# Patient Record
Sex: FEMALE | Race: OTHER | Employment: FULL TIME | URBAN - METROPOLITAN AREA
[De-identification: names, ages, dates, MRNs, and addresses within clinical notes are randomized per-mention and may not be internally consistent; named-entity substitution may affect disease eponyms.]

---

## 2018-10-22 ENCOUNTER — HOSPITAL ENCOUNTER (OUTPATIENT)
Dept: PHYSICAL THERAPY | Age: 48
Discharge: HOME | End: 2018-10-22
Payer: COMMERCIAL

## 2018-10-22 PROCEDURE — 97140 MANUAL THERAPY 1/> REGIONS: CPT | Performed by: PHYSICAL THERAPIST

## 2018-10-22 PROCEDURE — 97162 PT EVAL MOD COMPLEX 30 MIN: CPT | Performed by: PHYSICAL THERAPIST

## 2018-10-22 NOTE — PROGRESS NOTES
PHYSICAL THERAPY - DAILY TREATMENT NOTE Patient Name: Tyler Stevens        Date: 10/22/2018 : 1970   YES Patient  Verified Visit #:   1   of   8  Insurance: Payor: Jered Marlow / Plan: [place] [employer]  CAPITATED PT / Product Type: Commerical / In time: 935 Out time:  Total Treatment Time: 54 TREATMENT AREA =  Neck, HA, SUBJECTIVE Pain Level (on 0 to 10 scale): 5-6 / 10 Medication Changes/New allergies or changes in medical history, any new surgeries or procedures? NO    If yes, update Summary List  
Subjective Functional Status/Changes:  []  No changes reported See IE OBJECTIVE 20 min Manual Therapy: Technique:     
Craniosacral therapy for CV4 release, frontal, parietal, sphenoid and OA release. Rationale:      decrease pain, increase ROM, increase tissue extensibility and decrease trigger points to improve patient's ability to  improve patient's ability to perform ADL's with decreased pain NC min Therapeutic Exercise:  [x]  See flow sheet Rationale:      increase ROM and increase strength to improve the patients ability to  improve patient's ability to perform ADL's with decreased pain 
  
throughout therapy min Patient Education:  Zohaib Robert []  Progressed/Changed HEP based on:   Educated the pt on anatomy of current condition, educated the patient on proper sleeping posture and initiated initial HEP. See chart Other Objective/Functional Measures: 
 
See IE Post Treatment Pain Level (on 0 to 10) scale:   3  / 10 ASSESSMENT Assessment/Changes in Function:  
 
See POC Decreaesd pain following CST []  See Progress Note/Recertification Patient will continue to benefit from skilled PT services to see POC Progress toward goals / Updated goals: 
See POC PLAN [x]  Upgrade activities as tolerated YES Continue plan of care  
[]  Discharge due to :   
[]  Other:   
 
Therapist: Rosa Pa, PT Date: 10/22/2018 Time: 1:36 PM  
 
Future Appointments Date Time Provider Zane Rodrigues 10/31/2018  3:30 PM Andrea Campuzano, PT [facility]  
11/5/2018 11:30 AM James Max, PT [facility]  
11/15/2018 10:30 AM Radhames Kulkarni PT [facility]  
11/19/2018 12:00 PM Radhames Kulkarni PT [facility]  
11/26/2018 10:30 AM James Max, PT [facility]

## 2018-10-22 NOTE — PROGRESS NOTES
Srinivasa Marques 31 [facility] PHYSICAL THERAPY AT [address] [facility], [address] [facility], [address], [address] [place] [address] - Phone: [phone]  Fax: [phone] PLAN OF CARE / STATEMENT OF MEDICAL NECESSITY FOR PHYSICAL THERAPY SERVICES Patient Name: Arthurine Mooring : 1970 Medical  
Diagnosis: Migraine, unspecified, not intractable, without status migrainosus [G43.909] Treatment Diagnosis: Migraine, unspecified, not intractable, without status migrainosus [G43.909] Onset Date: Chronic - increased frequency in last year Referral Source: Keeley Alberts MD Start of Care [facility]): 10/22/2018 Prior Hospitalization: See medical history Provider #: [id number] Prior Level of Function: Able to have increased recreational and exercise tolerance Comorbidities: Spinal fusion 2018 L5-S1 on precaution for flexion and lifting, CTS, hysterectomy and Ooverectomy  Medications: Verified on Patient Summary List  
The Plan of Care and following information is based on the information from the initial evaluation. Assessment / key information:  The pt is a 47 yo female who presents to [facility] with h/o chronic migraine HA without aura which intensified and increased in frequency within the last year insidiously. Max pain = 7/10 with unknown triggers but does note that deep tissue massage can bring on migraine, min pain= 0/10 reduced by reducing stress, walking. Her pain begins in the left shoulder (upper trap), moves around her ear to temple and over L eye. Reports the pain is always on the left and maybe twice a year moves over to her R eye. Her migraines have increased in frequency and last 3-4 days occurring 3 x per month, otherwise she has a HA 3-4 days per week. She is a  working 40 hours per week and is able to work through the pain.    Upon evaluation, the pt presents with 1) poor posture forward head and shoulders, , 2) lcervical ROM is [facility] except rotation R = 78 degrees, L=70 degrees  Upper body MMT = 5/5, 3) hypomobility through mid thoracic spine, TTP Tr-3, C7, C3-2. L>R UT ms spasms. Decreased amplitude of Craniosacral rhythm noted L>R. 4) special tests: cervical retraction reduced UT and cervical pain indicating possible disc pathology. FOTO = 74. She would benefit from a course of skilled PT to address these deficits and reduce HA pain and improve her posture which is likely contributing to pain.  
============================================================================= Eval Complexity: History MEDIUM  Complexity : 1-2 comorbidities / personal factors will impact the outcome/ POC ;  Examination  HIGH Complexity : 4+ Standardized tests and measures addressing body structure, function, activity limitation and / or participation in recreation ; Presentation MEDIUM Complexity : Evolving with changing characteristics ; Decision Making MEDIUM Complexity : FOTO score of 26-74; Overall Complexity MEDIUM Problem List: pain affecting function, decrease ROM, decrease strength, decrease ADL/ functional abilitiies and decrease activity tolerance Treatment Plan may include any combination of the following: Therapeutic exercise, Therapeutic activities, Neuromuscular re-education, Physical agent/modality, Manual therapy and Patient education  Dry needling Patient / Family readiness to learn indicated by: asking questions, trying to perform skills and interest 
Persons(s) to be included in education: patient (P) Barriers to Learning/Limitations: None Measures taken:    
Patient Goal (s): Relief from migraines or reduction of frequency. Patient self reported health status: good Rehabilitation Potential: good · Short Term Goals: To be accomplished in  4 weeks: 1. I with basic HEP and strategies to relieve pain. 2. The pt will report decreased frequency to <2 x per month for migraine HA. 2. The pt will report +2 Global rating of change to indicate reduced pain and increased ADL tolerance. · Long Term Goals: To be accomplished in  6 -8  weeks 1. I with final HEP and strategies to relieve pain. 2. The pt will have WFL and symmetrical cervical ROM to reduce tension through c/s and reduce HA. 3. The pt will report +4 or greater Global Rating of change to indicate increased ADL tolerance. 4. Pt to report >75% reduction in pain and frequency of migraines. Frequency / Duration:       Patient to be seen  2  times per week for 6-8 weeks. Patient / Caregiver education and instruction: activity modification and exercises Therapist Signature: Craig Mckay PT Date: 10/22/2018 Certification Period:  Time: 8:49 PM  
 
I certify that the above Physical Therapy Services are being furnished while the patient is under my care. I agree with the treatment plan and certify that this therapy is necessary. Physician Signature:       Date:      Time:  Please sign and return to [facility] or you may fax the signed copy to [phone]. Thank you.

## 2018-10-31 ENCOUNTER — HOSPITAL ENCOUNTER (OUTPATIENT)
Dept: PHYSICAL THERAPY | Age: 48
Discharge: HOME | End: 2018-10-31
Payer: COMMERCIAL

## 2018-10-31 PROCEDURE — 97140 MANUAL THERAPY 1/> REGIONS: CPT

## 2018-10-31 NOTE — PROGRESS NOTES
PHYSICAL THERAPY - DAILY TREATMENT NOTE Patient Name: Vivian Welch        Date: 10/31/2018 : 1970   YES Patient  Verified Visit #:   2   of   8  Insurance: Payor: Sid Fee / Plan: [place] [employer]  CAPITATED PT / Product Type: Commerical / In time: 340 Out time: 415 Total Treatment Time: 35 Medicare Time Tracking (below) Total Timed Codes (min):   1:1 Treatment Time:    
TREATMENT AREA = Migraine, unspecified, not intractable, without status migrainosus [G43.909] SUBJECTIVE Pain Level (on 0 to 10 scale):  1-4  / 10 Medication Changes/New allergies or changes in medical history, any new surgeries or procedures? NO    If yes, update Summary List  
Subjective Functional Status/Changes:  []  No changes reported Great after first visit-- migraine went away for several hours. Had a HA earlier today--took Excedrine. Working on retraction ex about 3x/day. OBJECTIVE 5 min Therapeutic Exercise:  [x]  See flow sheet Rationale:      increase ROM and dec neural compromise to improve the patients ability to perform ADLs 30 min Manual Therapy: Technique:     
[x] STM[]IASTM[x]TPR[]PROM[] Stretching 
[x] SOR[x] man traction[x] man retract/ man tract/retract/ext[]OP with REIL 
[]Jt manipulation []Gr I [] II []  III [] IV[] V[] Treatment Area:  CS  
Rationale:      decrease pain, decrease trigger points and dec neural compromise to improve patient's ability to perform ADLs 
  throughout Rx min Patient Education:  Louie Spatz []  Progressed/Changed HEP based on: Other Objective/Functional Measures: No pain after treatment Full Cx ROM Post Treatment Pain Level (on 0 to 10) scale:   0  / 10 ASSESSMENT Assessment/Changes in Function:  
Responding well to treatment with dec pain intensity 
  
[]  See Progress Note/Recertification Patient will continue to benefit from skilled PT services to modify and progress therapeutic interventions, address functional mobility deficits, analyze and address soft tissue restrictions and dec neural compromise to attain remaining goals. Progress toward goals / Updated goals: PLAN [x]  Upgrade activities as tolerated YES Continue plan of care  
[]  Discharge due to :   
[]  Other:   
 
Therapist: Fernanda Honeycutt PT Date: 10/31/2018 Time: 3:31 PM  
 
Future Appointments Date Time Provider Zane Rodrigues 11/5/2018 11:30 AM Bard Yossi Max, PT [facility]  
11/15/2018 10:30 AM Broderick Hernandez PT [facility]  
11/19/2018 12:00 PM Broderick Hernandez PT [facility]  
11/26/2018 10:30 AM Bard Yossi Max, PT [facility]

## 2018-11-05 ENCOUNTER — HOSPITAL ENCOUNTER (OUTPATIENT)
Dept: PHYSICAL THERAPY | Age: 48
Discharge: HOME | End: 2018-11-05
Payer: COMMERCIAL

## 2018-11-05 PROCEDURE — 97140 MANUAL THERAPY 1/> REGIONS: CPT | Performed by: PHYSICAL THERAPIST

## 2018-11-05 PROCEDURE — 97110 THERAPEUTIC EXERCISES: CPT | Performed by: PHYSICAL THERAPIST

## 2018-11-05 NOTE — PROGRESS NOTES
PHYSICAL THERAPY - DAILY TREATMENT NOTE Patient Name: Miguel Ángel Sotelo        Date: 2018 : 1970   YES Patient  Verified Visit #:   3   of   8  Insurance: Payor: Pranav Agrawal / Plan: [place] [employer]  [employer] PT / Product Type: Commerical / In time: 1135 Out time: 4810 Total Treatment Time: 44 TREATMENT AREA =  Migraine, unspecified, not intractable, without status migrainosus [G43.909] SUBJECTIVE Pain Level (on 0 to 10 scale):  2  / 10 Medication Changes/New allergies or changes in medical history, any new surgeries or procedures? NO    If yes, update Summary List  
Subjective Functional Status/Changes:  []  No changes reported Functional improvements: no migraines since first visit Functional impairments: ongoing HA's. OBJECTIVE 28 min Manual Therapy: Technique:     
Craniosacral therapy for CV4 release, hyoid, diaphragm, thoracic inlet and pelvic floor release. Gentle traction to sacrum and cranium. Released parietal, frontal, sphenoid and temporal bones. OA release Rationale:      decrease pain, increase ROM and increase tissue extensibility to improve patient's ability to relieve HAs. 11 min Therapeutic Exercise:  [x]  See flow sheet Rationale:      increase ROM and increase strength to improve the patients ability to  improve patient's ability to perform ADL's with decreased pain 
throughout therapy min Patient Education:  Shazia Gonsalves []  Progressed/Changed HEP based on: Other Objective/Functional Measures: 
 
R temporal bone and sub-occipital musculature feels restricted. Post Treatment Pain Level (on 0 to 10) scale:     / 10 ASSESSMENT Assessment/Changes in Function:  
 
Improved mobility noted to the cranial bones as compared to the first visit. The pt is responding well to therapy and addition of chin retraction with extension to reduce upper trap pain. []  See Progress Note/Recertification Patient will continue to benefit from skilled PT services to modify and progress therapeutic interventions, address functional mobility deficits, address ROM deficits, address strength deficits, analyze and address soft tissue restrictions, analyze and cue movement patterns, analyze and modify body mechanics/ergonomics and assess and modify postural abnormalities to attain remaining goals. Progress toward goals / Updated goals: 
Progressing towards goals, will monitor and progress as able PLAN [x]  Upgrade activities as tolerated YES Continue plan of care  
[]  Discharge due to :   
[]  Other:   
 
Therapist: Chloé Cool PT Date: 11/5/2018 Time: 2:02 PM  
 
Future Appointments Date Time Provider Zane Rodrigues 11/15/2018 10:30 AM Alex Max, PT [facility]  
11/19/2018 12:00 PM La Mendez, PT [facility]  
11/26/2018 10:30 AM Alex Max, PT [facility]

## 2018-11-15 ENCOUNTER — HOSPITAL ENCOUNTER (OUTPATIENT)
Dept: PHYSICAL THERAPY | Age: 48
Discharge: HOME | End: 2018-11-15
Payer: COMMERCIAL

## 2018-11-15 PROCEDURE — 97140 MANUAL THERAPY 1/> REGIONS: CPT | Performed by: PHYSICAL THERAPIST

## 2018-11-15 PROCEDURE — 97110 THERAPEUTIC EXERCISES: CPT | Performed by: PHYSICAL THERAPIST

## 2018-11-15 NOTE — PROGRESS NOTES
PHYSICAL THERAPY - DAILY TREATMENT NOTE Patient Name: Tyler Stevens        Date: 11/15/2018 : 1970   YES Patient  Verified Visit #:   4   of   8  Insurance: Payor: Jered Marlow / Plan: [employer]  CAPITATED PT / Product Type: Commerical / In time: 1035 Out time: 1125 Total Treatment Time: 50 TREATMENT AREA =  Migraine, unspecified, not intractable, without status migrainosus [G43.909] SUBJECTIVE Pain Level (on 0 to 10 scale):  1-2  / 10 Medication Changes/New allergies or changes in medical history, any new surgeries or procedures? NO    If yes, update Summary List  
Subjective Functional Status/Changes:  []  No changes reported Functional improvements: Having less migraines and tries techniques learned in therapy to reduce. Functional impairments: migraine occurred this weak OBJECTIVE 35 min Manual Therapy: Technique:     
Craniosacral therapy for CV4 release, hyoid,   Released parietal, frontal, sphenoid and temporal bones. Release to parietal and frontal bones simutaneously to release the temporal bones. OA release Rationale:      decrease pain, increase ROM and increase tissue extensibility to improve patient's ability to reduce migraines/HA. 15 min Therapeutic Exercise:  [x]  See flow sheet Rationale:      increase ROM and increase strength to improve the patients ability to reduce HA.  
throughout therapy min Patient Education:  Zohaib Robert []  Progressed/Changed HEP based on: Other Objective/Functional Measures: Added  exercises: row, LS stretch. Post Treatment Pain Level (on 0 to 10) scale:   0  / 10 ASSESSMENT Assessment/Changes in Function:  
 
Decreaesd pain post therapy. L>R sub occipital tight. May benefit from dry needling if MD agrees. []  See Progress Note/Recertification Patient will continue to benefit from skilled PT services to modify and progress therapeutic interventions, address functional mobility deficits, address ROM deficits, address strength deficits, analyze and address soft tissue restrictions and analyze and cue movement patterns to attain remaining goals. Progress toward goals / Updated goals: 
Meeting all STG's with compliance ti HEP, reduced frequency to <2 x a month. PLAN [x]  Upgrade activities as tolerated YES Continue plan of care  
[]  Discharge due to :   
[]  Other:   
 
Therapist: Rose Weathers PT Date: 11/15/2018 Time: 2:24 PM  
 
Future Appointments Date Time Provider Zane Rodrigues 11/19/2018 12:00 PM Jennifer Max, PT [facility]  
11/26/2018 10:30 AM Jennifer Max, PT [facility]

## 2018-11-19 ENCOUNTER — HOSPITAL ENCOUNTER (OUTPATIENT)
Dept: PHYSICAL THERAPY | Age: 48
Discharge: HOME | End: 2018-11-19
Payer: COMMERCIAL

## 2018-11-19 PROCEDURE — 97140 MANUAL THERAPY 1/> REGIONS: CPT | Performed by: PHYSICAL THERAPIST

## 2018-11-19 PROCEDURE — 97110 THERAPEUTIC EXERCISES: CPT | Performed by: PHYSICAL THERAPIST

## 2018-11-19 NOTE — PROGRESS NOTES
PHYSICAL THERAPY - DAILY TREATMENT NOTE Patient Name: Portillo Alvarez        Date: 2018 : 1970   YES Patient  Verified Visit #:   5   of   8  Insurance: Payor: [place] / Plan: [facility] [employer]  CAPITATED PT / Product Type: Commerical / In time: 12 Out time: 1250 Total Treatment Time: 50 TREATMENT AREA =  Migraine, unspecified, not intractable, without status migrainosus [G43.909] SUBJECTIVE Pain Level (on 0 to 10 scale):  1-2  / 10 Medication Changes/New allergies or changes in medical history, any new surgeries or procedures? NO    If yes, update Summary List  
Subjective Functional Status/Changes:  []  No changes reported Functional improvements: no HA for the rest of the day after last visit. Functional impairments: Pain is coming/going to day and always on the L side. OBJECTIVE 30 min Manual Therapy: Technique:     
Craniosacral therapy for CV4 release, hyoid, and thoracic inlet release. Released parietal, frontal, sphenoid and temporal bones. Release to parietal and frontal bones simutaneously to release the temporal bones. OA release. DTM to oscar Dutton PA mobs C7-5. Rationale:      decrease pain, increase ROM and increase tissue extensibility to improve patient's ability to reduce HA frequency. 20 min Therapeutic Exercise:  [x]  See flow sheet Rationale:      increase ROM and increase strength to improve the patients ability to reduce HA. 0 
throughout therapy min Patient Education:  YES  Reviewed HEP []  Progressed/Changed HEP based on: Other Objective/Functional Measures: 
 
Tension to the L>R sub-occipital triangle. Post Treatment Pain Level (on 0 to 10) scale:   0  / 10 ASSESSMENT Assessment/Changes in Function: The pt would benefit from dry-needling to help release spasms to OA. Good release during session, spasms return following day. []  See Progress Note/Recertification Patient will continue to benefit from skilled PT services to modify and progress therapeutic interventions, address functional mobility deficits, address ROM deficits, address strength deficits, analyze and address soft tissue restrictions, analyze and cue movement patterns and analyze and modify body mechanics/ergonomics to attain remaining goals. Progress toward goals / Updated goals: 
Met STG 1, compliant with HEP, hasn't met STG 2 for decreased frequency of HA to < 2 x per month. PLAN [x]  Upgrade activities as tolerated YES Continue plan of care  
[]  Discharge due to :   
[x]  Other: The pt would benefit from dry-needling. Will send a script to MD to add. Therapist: Surinder Nolasco PT Date: 11/19/2018 Time: 1:00 PM  
 
Future Appointments Date Time Provider Zane Rodrigues 11/26/2018 10:30 AM Jaquelin Max, PT [facility]  
12/3/2018 11:00 AM Karla Maria [facility]  
12/10/2018 11:30 AM Jaquelin Max, PT [facility]  
12/20/2018 12:00 PM Jaquelin Max, PT [facility]

## 2018-11-19 NOTE — PROGRESS NOTES
Srinivasa Marques 31 [facility] PHYSICAL THERAPY AT [address] [facility], [address] [place] [address], [address] [place] [address], [address] [place] [address]  Phone: [phone]  Fax: [phone] Request for use of Dry Needling/Intramuscular Manual Therapy Patient Name: Amisha Rivera : 1970 Treatment/Medical Diagnosis: Migraine, unspecified, not intractable, without status migrainosus [G43.909] Referral Source: Luiza Leonard MD    
Date of Initial Visit: 10/12/18 Attended Visits: 10 Missed Visits: 0 Based on findings from the physical therapy examination and evaluation, the evaluating therapist believes the patient, Amisha Rivera would benefit from including Dry Needling as part of the plan of care. Dry needling is an effective treatment technique utilized in conjunction with other physical therapy interventions to inactivate myofascial trigger points and the pain and dysfunction they cause. It involves the use of a very fine (usually 0.3 mm/30 gauge) solid filament sterile needle (also used for acupuncture) which is inserted into the skin and directly into a myofascial trigger point. Repeated strokes or movements of the needle without completely withdrawing it help to inactive the trigger point, all of which may take approximately 30 - 60 seconds at each site. Benefits include inactivation of trigger points, decreased pain, increased muscle length, improved movement patterns, and restoration of function. Potential risks include the following: post-needling soreness, infection, bruising/bleeding, penetration of a nerve, and pneumothorax. All treating therapist have been thoroughly educated in ways to avoid the adverse reactions. Dry Needling is an advanced procedure that requires additional training including greater than 54 hours of intensive course work.  Most treatment programs will consist of one session of dry needling each week and a possible second treatment to consist of muscle re-education, flexibility, strengthening and other manual techniques to facilitate the benefits from the dry needling therapy. If you agree with this recommendation, please sign the attached prescription form and fax it to us at [phone]. If you have questions or concerns regarding dry needling or any other treatment we may be providing, please contact us at [phone]. Thank you for allowing us to assist in the care of your patient. Therapist Signature: Janell Boone PT Date: 11/19/2018 Time: 1:30 PM  
NOTE TO PHYSICIAN:  PLEASE COMPLETE THE ORDERS BELOW AND FAX TO [facility]: [phone] If you are unable to process this request in 24 hours please contact our office: [phone] ? I have read the above request and AGREE to the recommendation of including dry needling as part of the plan of care. ? I have read the above request and DO NOT AGREE to including dry needling as part of the plan of care. ? I have read the above report and request that my patient continue therapy with the following changes/special instructions: _________________________________________________ Physician Signature:       Date:      Time:

## 2018-11-26 ENCOUNTER — HOSPITAL ENCOUNTER (OUTPATIENT)
Dept: PHYSICAL THERAPY | Age: 48
Discharge: HOME | End: 2018-11-26
Payer: COMMERCIAL

## 2018-11-26 PROCEDURE — 97110 THERAPEUTIC EXERCISES: CPT | Performed by: PHYSICAL THERAPIST

## 2018-11-26 PROCEDURE — 97140 MANUAL THERAPY 1/> REGIONS: CPT | Performed by: PHYSICAL THERAPIST

## 2018-11-26 NOTE — PROGRESS NOTES
PHYSICAL THERAPY - DAILY TREATMENT NOTE  - Patient Name: Corazon Garcia        Date: 2018 : 1970   YES Patient  Verified Visit #:   6   of   8  Insurance: Payor: Cookie Osborn / Plan: [place] [employer]  CAPITATED PT / Product Type: Commerical / In time: 1040 Out time: 1110 Total Treatment Time: 30 Medicare/[facility] Time Tracking (below) Total Timed Codes (min):   1:1 Treatment Time:    
TREATMENT AREA =  Migraine, unspecified, not intractable, without status migrainosus [G43.909] SUBJECTIVE Pain Level (on 0 to 10 scale):  2  / 10 Medication Changes/New allergies or changes in medical history, any new surgeries or procedures? NO    If yes, update Summary List  
Subjective Functional Status/Changes:  []  No changes reported Functional improvements: Less frequent migraine. Functional impairments: pain today with weather changes. The pt reports that the doctor signed script for dry needling as requested by therapist. 
  
 
Dick Zaman 10 min Therapeutic Exercise:  [x]  See flow sheet Rationale:      increase ROM and increase strength to improve the patients ability to  improve patient's ability to perform ADL's with decreased pain 20 min Manual Therapy: Technique:     
OA release, temporal release. DTM to cervical paraspinals and sub-occipital.   
Rationale:      decrease pain, increase ROM, increase tissue extensibility and decrease trigger points to improve patient's ability to have less frequent migraines. min Gait Training:   
Rationale:     
throughout therapy min Patient Education:  Katlyn Diehl []  Progressed/Changed HEP based on: Other Objective/Functional Measures: 
 
Ongoing tightness to sub-occipital musculature. Progressed strengthening exercises to add shoulder extension. Post Treatment Pain Level (on 0 to 10) scale:   0  / 10 ASSESSMENT Assessment/Changes in Function: The pt has ongoing tightness to the cervical musculature which continue to occur day after therapy. []  See Progress Note/Recertification Patient will continue to benefit from skilled PT services to modify and progress therapeutic interventions, address functional mobility deficits, address ROM deficits, address strength deficits, analyze and address soft tissue restrictions, analyze and cue movement patterns and analyze and modify body mechanics/ergonomics to attain remaining goals. Progress toward goals / Updated goals: 
Progressing towards goals, will monitor and progress as able PLAN 
 
[]  Upgrade activities as tolerated YES Continue plan of care  
[]  Discharge due to :   
[]  Other:   
 
Therapist: King Jorge PT Date: 11/26/2018 Time: 1:49 PM  
 
Future Appointments Date Time Provider Zane Rodrigues 12/3/2018 11:00 AM Jorge Max, PT [facility]  
12/5/2018  9:00 AM Evangelina Stallworth, PT [facility]  
12/10/2018 11:30 AM Jorge Max, PT [facility]  
12/20/2018 12:00 PM Rola Akins PT [facility]  
12/27/2018 12:00 PM Rola Akins, PT [facility]  
12/31/2018 11:30 AM Jorge Max, PT [facility]

## 2018-12-03 ENCOUNTER — HOSPITAL ENCOUNTER (OUTPATIENT)
Dept: PHYSICAL THERAPY | Age: 48
Discharge: HOME | End: 2018-12-03
Payer: COMMERCIAL

## 2018-12-03 PROCEDURE — 97140 MANUAL THERAPY 1/> REGIONS: CPT | Performed by: PHYSICAL THERAPIST

## 2018-12-03 PROCEDURE — 97110 THERAPEUTIC EXERCISES: CPT | Performed by: PHYSICAL THERAPIST

## 2018-12-03 NOTE — PROGRESS NOTES
PHYSICAL THERAPY - DAILY TREATMENT NOTE Patient Name: Cal Bran        Date: 12/3/2018 : 1970   YES Patient  Verified Visit #:   7 (2)   of   8  Insurance: Payor: Joellen Anchors / Plan: [employer]  CAPITATED PT / Product Type: Commerical / In time: 11 Out time: 1139 Total Treatment Time: 44 TREATMENT AREA =  Migraine, unspecified, not intractable, without status migrainosus [G43.909] SUBJECTIVE Pain Level (on 0 to 10 scale):  2  / 10 Medication Changes/New allergies or changes in medical history, any new surgeries or procedures? NO    If yes, update Summary List  
Subjective Functional Status/Changes:  []  No changes reported Functional improvements: almost off of Topamax Functional impairments: pain is fluctuatting to L side of head. Does neck exercises and pain reduces. Has been sleeping with a dog in bed and is trying to sleep around the dog. OBJECTIVE 25 min Manual Therapy: Technique:     
[x] S/DTM []IASTM []PROM [] Passive Stretching  
[x]manual TPR []Jt manipulation:Gr I [] II []  III [] IV[] V[] Treatment Area:  Craniosacral CV4 release, thoracic, hyoid, and OA release. DTM Rationale:      decrease pain, increase ROM and increase tissue extensibility to improve patient's ability to reduce HA's. 14 min Therapeutic Exercise:  [x]  See flow sheet Rationale:      increase ROM and increase strength to improve the patients ability to improve postural alignment to reduce HA's.  
 
throughout therapy min Patient Education:  YES  Reviewed HEP []  Progressed/Changed HEP based on: Other Objective/Functional Measures: 
 
Received approval from doctor's office to add dry needling. R>L suboccipital and scalene ms are tight. Added ER with TB. Discussed postural alignment with sleeping position. Post Treatment Pain Level (on 0 to 10) scale:   0  / 10 ASSESSMENT Assessment/Changes in Function: The pt has ongoing ms spasms to c/s likely contributing to HA. Progressing off of migraine meds. []  See Progress Note/Recertification Patient will continue to benefit from skilled PT services to modify and progress therapeutic interventions, address functional mobility deficits, address ROM deficits, address strength deficits and analyze and address soft tissue restrictions to attain remaining goals. Progress toward goals / Updated goals: 
decrease pain meds. PLAN 
[]  Upgrade activities as tolerated YES Continue plan of care  
[]  Discharge due to :   
[]  Other:   
 
Therapist: Iraida Goodwin, PT Date: 12/3/2018 Time: 11:28 AM  
 
Future Appointments Date Time Provider Zane Rodrigues 12/5/2018  9:00 AM Sigrid Dorman, PT [facility]  
12/10/2018 11:30 AM Vidrine, Claude Spiro, PT [facility]  
12/20/2018 12:00 PM Padmaja Mcfarlane PT [facility]  
12/27/2018 12:00 PM Padmaja Mcfarlane PT [facility]  
12/31/2018 11:30 AM Vidrine, Claude Spiro, PT [facility]

## 2018-12-05 ENCOUNTER — HOSPITAL ENCOUNTER (OUTPATIENT)
Dept: PHYSICAL THERAPY | Age: 48
Discharge: HOME | End: 2018-12-05
Payer: COMMERCIAL

## 2018-12-05 PROCEDURE — 97110 THERAPEUTIC EXERCISES: CPT

## 2018-12-05 PROCEDURE — 97140 MANUAL THERAPY 1/> REGIONS: CPT

## 2018-12-05 NOTE — PROGRESS NOTES
PHYSICAL THERAPY - DAILY TREATMENT NOTE - DRY NEEDLE NOTE Patient Name: Glo Reese        Date: 2018 : 1970   YES Patient  Verified Visit #:   8 (3)   of   8  Insurance: Payor: Divine Marques / Plan: [place] [employer]  CAPITATED PT / Product Type: Commerical / In time: 900 Out time: 950 Total Treatment Time: 50 Medicare Time Tracking (below) Total Timed Codes (min):   1:1 Treatment Time:    
TREATMENT AREA =  Migraine, unspecified, not intractable, without status migrainosus [G43.909] SUBJECTIVE Pain Level (on 0 to 10 scale):  2  / 10 Medication Changes/New allergies or changes in medical history, any new surgeries or procedures? NO    If yes, update Summary List  
Subjective Functional Status/Changes:  []  No changes reported Neck/ shoulder discomfort on both sides, L>R . Used tennis balls last night and it helped OBJECTIVE Modalities Rationale:  Decrease post- needling soreness to improve patient's ability to perform ADLs 
 
 min [] Estim, type/location:   
                                 []  att     []  unatt     []  w/US     []  w/ice    []  w/heat 
 min []  Mechanical Traction: type/lbs   
               []  pro   []  sup   []  int   []  cont    []  before manual    []  after manual  
 min []  Ultrasound, settings/location:    
 min []  Iontophoresis w/ dexamethasone, location:   
                                           []  take home patch       []  in clinic  
10 min []  Ice     [x]  Heat    location/position:   
 min []  Vasopneumatic Device, press/temp:   
 min []  Other:   
[x] Skin assessment post-treatment (if applicable):   
[x]  intact    [x]  redness- no adverse reaction    
[]redness  adverse reaction:     
10 min Therapeutic Exercise:  [x]  See flow sheet Rationale:      increase ROM, increase strength and inc stability to improve the patients ability to perform ADLs with less pain 25 min Manual Therapy: DN with ESTIM-( needle insertion time not included) Rationale:      decrease pain, increase ROM, increase tissue extensibility and decrease trigger points to improve patient's ability to perform ADLs with less pain 
 
throughout treatment time min Patient Education:  Yes  Reviewed HEP []  Progressed/Changed HEP based on: OTHER OBJECTIVE/FUNCTIONAL MEASURES: 
 
 
 
Dry Needling Procedure Note Dry Needle Session Number:  1 Procedure: An intramuscular manual therapy (dry needling) and a neuro-muscular re-education treatment was done to deactivate myofascial trigger points, with a 15/30 gauge solid filament needle, under aseptic technique. Indication(s): [x] Muscle spasms [x] Myalgia/Myositis  [] Muscle cramps [x] Muscle imbalances [] TMD (TMJ) [x] Myofascial pain & dysfunction 
   [] Other: __ Chart reviewed for the following: 
Maria Isabel ALICEA PT, have reviewed the medical history, summary list and precautions/contraindications for [employer]. TIME OUT performed immediately prior to start of procedure: 
905 (enter time the timeout was completed) Maria Isabel ALICEA PT, have performed the following reviews on [place] [facility] prior to the start of the session:     
[x] Patient was identified by name and date of birth   
[x] Agreement on all muscles being treated was verified  
[x] Purpose of dry needling, side effects, possible complications, risks and benefits were explained to the patient [x] Procedure site(s) verified 
[x] Patient was positioned for comfort and draped for privacy [x] Informed Consent was signed (initial visit) and verified verbally (subsequent visits) [x] Patient was instructed on the need to report the use of blood thinners and/or immunosuppressant medications [x] How to respond to possible adverse effects of treatment 
[x] Self treatment of post needling soreness: ice, heat (moist heat, heat wraps) and stretching 
[x] Opportunity was given to ask any questions, all questions were answered Treatment: The following muscles were treated today: 
 
Right: UT, LS, RC- with ESTIM Left: UT, LS, RC- with ESTIM Patients response to todays treatment:  
[x]  LTRs [x]  Muscle Relaxation  []  Pain Relief  []  Increased strength/ stability []  Decreased HAs [x]  Post needling soreness []  Increased ROM []  Other:   
 
  
Post Treatment Pain Level (on 0 to 10) scale:   0  / 10 ASSESSMENT Assessment/Changes in Function: No increase pain following DN Good ROM- mild stiffness reported 
  
[]  See Progress Note/Recertification Patient will continue to benefit from skilled PT services to modify and progress therapeutic interventions, address functional mobility deficits, address ROM deficits, address strength deficits, analyze and address soft tissue restrictions, analyze and cue movement patterns and assess and modify postural abnormalities to attain remaining goals. Progress toward goals / Updated goals: 
Steady progress PLAN [x]  Upgrade activities as tolerated yes Continue plan of care  
[]  Discharge due to :   
[]  Other:   
 
Therapist: Regulo Gallardo PT Date: 12/5/2018 Time: 8:57 AM

## 2018-12-10 ENCOUNTER — HOSPITAL ENCOUNTER (OUTPATIENT)
Dept: PHYSICAL THERAPY | Age: 48
Discharge: HOME | End: 2018-12-10
Payer: COMMERCIAL

## 2018-12-10 PROCEDURE — 97110 THERAPEUTIC EXERCISES: CPT | Performed by: PHYSICAL THERAPIST

## 2018-12-10 PROCEDURE — 97140 MANUAL THERAPY 1/> REGIONS: CPT | Performed by: PHYSICAL THERAPIST

## 2018-12-10 NOTE — PROGRESS NOTES
PHYSICAL THERAPY - DAILY TREATMENT NOTE Patient Name: Volodymyr Pass        Date: 12/10/2018 : 1970   YES Patient  Verified Visit #:   9 (4)   of   8  Insurance: Payor: Adriana Peter / Plan: [employer]  CAPITATED PT / Product Type: Commerical / In time: 1130 Out time: 1205 Total Treatment Time: 35 TREATMENT AREA =  Migraine, unspecified, not intractable, without status migrainosus [G43.909] SUBJECTIVE Pain Level (on 0 to 10 scale):  1  / 10 Medication Changes/New allergies or changes in medical history, any new surgeries or procedures? NO    If yes, update Summary List  
Subjective Functional Status/Changes:  []  No changes reported The L side of my neck gets tight and it's releasing, the R feels tight. Gets the tension and then it releases itself OBJECTIVE 20 min Manual Therapy: Technique:     
[x] S/DTM []IASTM []PROM [x] Passive Stretching  
[x]manual TPR []Jt manipulation:Gr I [] II []  III [] IV[] V[] Treatment Area:  B UT's/ LS, OA release. Cervical rotation stretch. Rationale:      decrease pain, increase ROM and increase tissue extensibility to improve patient's ability to reduce HA. 15 min Therapeutic Exercise:  [x]  See flow sheet Rationale:      increase ROM and increase strength to improve the patients ability to increase postural support. throughout therapy min Patient Education:  YES  Reviewed HEP []  Progressed/Changed HEP based on: Other Objective/Functional Measures: 
 
Educated pt on ergonomic set up for computer. Added increased postural strengthening. Post Treatment Pain Level (on 0 to 10) scale:   0  / 10 ASSESSMENT Assessment/Changes in Function: Ms spasms R>L UT, LS today. Relieved with DTM and TrP release. []  See Progress Note/Recertification Patient will continue to benefit from skilled PT services to modify and progress therapeutic interventions, address functional mobility deficits, address ROM deficits, address strength deficits and analyze and address soft tissue restrictions to attain remaining goals. Progress toward goals / Updated goals: 
Progressing towards goals, will monitor and progress as able PLAN [x]  Upgrade activities as tolerated YES Continue plan of care  
[]  Discharge due to :   
[]  Other:   
 
Therapist: Charlene Fields PT Date: 12/10/2018 Time: 11:50 AM  
 
Future Appointments Date Time Provider Zane Rodrigues 12/12/2018  9:30 AM Nette Estrella PT [facility]  
12/19/2018  1:00 PM Nette Estrella PT [facility]  
12/27/2018  9:00 AM Nette Estrella PT [facility]  
12/31/2018 11:30 AM Shira Max, PT [facility]

## 2018-12-12 ENCOUNTER — HOSPITAL ENCOUNTER (OUTPATIENT)
Dept: PHYSICAL THERAPY | Age: 48
Discharge: HOME | End: 2018-12-12
Payer: COMMERCIAL

## 2018-12-12 PROCEDURE — 97140 MANUAL THERAPY 1/> REGIONS: CPT

## 2018-12-12 PROCEDURE — 97110 THERAPEUTIC EXERCISES: CPT

## 2018-12-12 NOTE — PROGRESS NOTES
PHYSICAL THERAPY - DAILY TREATMENT NOTE - DRY NEEDLE NOTE    Patient Name: Corazon Garcia        Date: 2018  : 1970   YES Patient  Verified  Visit #:   10(5)   of   8  Insurance: Payor: Cookie Osborn / Plan: [address] PT / Product Type: Commerical /      In time: 930 Out time: 1025   Total Treatment Time: 55     Medicare Time Tracking (below)   Total Timed Codes (min):   1:1 Treatment Time:       TREATMENT AREA =  Migraine, unspecified, not intractable, without status migrainosus [G43.909]    SUBJECTIVE  Pain Level (on 0 to 10 scale):  2  / 10   Medication Changes/New allergies or changes in medical history, any new surgeries or procedures? NO    If yes, update Summary List   Subjective Functional Status/Changes:  []  No changes reported     L neck stiffness. Overall- marked improvement.   Tightness not staying          OBJECTIVE  Modalities Rationale:  Decrease post- needling soreness to improve patient's ability to perform ADLs     min [] Estim, type/location:                                      []  att     []  unatt     []  w/US     []  w/ice    []  w/heat    min []  Mechanical Traction: type/lbs                   []  pro   []  sup   []  int   []  cont    []  before manual    []  after manual    min []  Ultrasound, settings/location:      min []  Iontophoresis w/ dexamethasone, location:                                               []  take home patch       []  in clinic   10 min []  Ice     [x]  Heat    location/position:     min []  Vasopneumatic Device, press/temp:     min []  Other:    [x] Skin assessment post-treatment (if applicable):    [x]  intact    [x]  redness- no adverse reaction     []redness  adverse reaction:        15 min Therapeutic Exercise:  [x]  See flow sheet   Rationale:      increase ROM and increase strength to improve the patients ability to perform ADLs with less pain     25 min Manual Therapy: DN with ESTIM-( needle insertion time not included)   Rationale: decrease pain, increase ROM, increase tissue extensibility and decrease trigger points to improve patient's ability to perform ADLs with less pain      throughout treatment time min Patient Education:  Yes  Reviewed HEP   []  Progressed/Changed HEP based on:        OTHER OBJECTIVE/FUNCTIONAL MEASURES:        Dry Needling Procedure Note    Dry Needle Session Number:  2    Procedure: An intramuscular manual therapy (dry needling) and a neuro-muscular re-education treatment was done to deactivate myofascial trigger points, with a 15/30 gauge solid filament needle, under aseptic technique. Indication(s): [x] Muscle spasms [x] Myalgia/Myositis  [] Muscle cramps      [x] Muscle imbalances [] TMD (TMJ) [x] Myofascial pain & dysfunction     [] Other: __    Chart reviewed for the following:  Maria Del Rosario ALICEA PT, have reviewed the medical history, summary list and precautions/contraindications for [employer].     TIME OUT performed immediately prior to start of procedure:  681 (enter time the timeout was completed)  Maria Del Rosario ALICEA PT, have performed the following reviews on [employer] prior to the start of the session:      [x] Patient was identified by name and date of birth    [x] Agreement on all muscles being treated was verified   [x] Purpose of dry needling, side effects, possible complications, risks and benefits were explained to the patient   [x] Procedure site(s) verified  [x] Patient was positioned for comfort and draped for privacy  [x] Informed Consent was signed (initial visit) and verified verbally (subsequent visits)  [x] Patient was instructed on the need to report the use of blood thinners and/or immunosuppressant medications  [x] How to respond to possible adverse effects of treatment  [x] Self treatment of post needling soreness: ice, heat (moist heat, heat wraps) and stretching  [x] Opportunity was given to ask any questions, all questions were answered            Treatment:  The following muscles were treated today:    Right: UT, LS, RC- with estim   Left: UT, LS, RC- with estim     Patients response to todays treatment:   [x]  LTRs [x]  Muscle Relaxation  [x]  Pain Relief  [x]  Increased strength/ stability   []  Decreased HAs []  Post needling soreness []  Increased ROM   []  Other:         Post Treatment Pain Level (on 0 to 10) scale:   0  / 10     ASSESSMENT  Assessment/Changes in Function:     Relaxation of mm tone     []  See Progress Note/Recertification   Patient will continue to benefit from skilled PT services to modify and progress therapeutic interventions, address functional mobility deficits, address ROM deficits, address strength deficits, analyze and address soft tissue restrictions, analyze and cue movement patterns and analyze and modify body mechanics/ergonomics to attain remaining goals.    Progress toward goals / Updated goals:    Steady progress     PLAN  [x]  Upgrade activities as tolerated yes Continue plan of care   []  Discharge due to :    []  Other:      Therapist: Wood Kolb PT    Date: 12/12/2018 Time: 9:30 AM

## 2018-12-17 ENCOUNTER — APPOINTMENT (OUTPATIENT)
Dept: PHYSICAL THERAPY | Age: 48
End: 2018-12-17
Payer: COMMERCIAL

## 2018-12-19 ENCOUNTER — HOSPITAL ENCOUNTER (OUTPATIENT)
Dept: PHYSICAL THERAPY | Age: 48
Discharge: HOME | End: 2018-12-19
Payer: COMMERCIAL

## 2018-12-19 PROCEDURE — 97140 MANUAL THERAPY 1/> REGIONS: CPT

## 2018-12-19 PROCEDURE — 97110 THERAPEUTIC EXERCISES: CPT

## 2018-12-19 NOTE — PROGRESS NOTES
PHYSICAL THERAPY - DAILY TREATMENT NOTE    Patient Name: Loly Calzada        Date: 2018  : 1970   YES Patient  Verified  Visit #:   11(6)   of   8  Insurance: Payor: Benito Lopez / Plan: [address] / Product Type: Commerical /      In time: 105 Out time: 210   Total Treatment Time: 50     Medicare Time Tracking (below)   Total Timed Codes (min):   1:1 Treatment Time:       TREATMENT AREA = Migraine, unspecified, not intractable, without status migrainosus [G43.909]    SUBJECTIVE  Pain Level (on 0 to 10 scale):  1  / 10   Medication Changes/New allergies or changes in medical history, any new surgeries or procedures? NO    If yes, update Summary List   Subjective Functional Status/Changes:  []  No changes reported     BETTER.    2 haS SINCE LAST VISIT, BUT THEY WENT AWAY EASILY    Overall- at least 50% better. DN very helpful. Duration of pain/ HA much shorter.   Pain more intermittent now     Migraines 1 every 10 days, HAs 3-5x/week   OBJECTIVE               OBJECTIVE  Modalities Rationale:  Decrease post- needling soreness to improve patient's ability to perform ADLs                   min [] Estim, type/location:                                                            []  att     []  unatt     []  w/US     []  w/ice    []  w/heat     min []  Mechanical Traction: type/lbs                    []  pro   []  sup   []  int   []  cont    []  before manual    []  after manual     min []  Ultrasound, settings/location:        min []  Iontophoresis w/ dexamethasone, location:                                                []  take home patch       []  in clinic   10 min []  Ice     [x]  Heat    location/position:       min []  Vasopneumatic Device, press/temp:       min []  Other:     [x] Skin assessment post-treatment (if applicable):    [x]  intact    [x]  redness- no adverse reaction     []redness  adverse reaction:         15 min Therapeutic Exercise:  [x]  See flow sheet   Rationale: increase ROM and increase strength to improve the patients ability to perform ADLs with less pain      25 min Manual Therapy: DN with ESTIM-( needle insertion time not included)   Rationale:      decrease pain, increase ROM, increase tissue extensibility and decrease trigger points to improve patient's ability to perform ADLs with less pain              throughout treatment time min Patient Education:  Yes  Reviewed HEP   []  Progressed/Changed HEP based on:              OTHER OBJECTIVE/FUNCTIONAL MEASURES:           Dry Needling Procedure Note     Dry Needle Session Number:  3     Procedure:    An intramuscular manual therapy (dry needling) and a neuro-muscular re-education treatment was done to deactivate myofascial trigger points, with a 15/30 gauge solid filament needle, under aseptic technique.     Indication(s):          [x] Muscle spasms  [x] Myalgia/Myositis          [] Muscle cramps                                   [x] Muscle imbalances      [] TMD (TMJ)        [x] Myofascial pain & dysfunction                                [] Other: __     Chart reviewed for the following:  Delisa ALICEA PT, have reviewed the medical history, summary list and precautions/contraindications for [employer].     TIME OUT performed immediately prior to start of procedure:  115 (enter time the timeout was completed)  Delisa ALICEA, PT, have performed the following reviews on [employer] prior to the start of the session:      [x] Patient was identified by name and date of birth    [x] Agreement on all muscles being treated was verified   [x] Purpose of dry needling, side effects, possible complications, risks and benefits were explained to the patient   [x] Procedure site(s) verified  [x] Patient was positioned for comfort and draped for privacy  [x] Informed Consent was signed (initial visit) and verified verbally (subsequent visits)  [x] Patient was instructed on the need to report the use of blood thinners and/or immunosuppressant medicationsSO How to respond to possible adverse effects of treatment  [x] Self treatment of post needling soreness: ice, heat (moist heat, heat wraps) and stretching  [x] Opportunity was given to ask any questions, all questions were answered     Treatment:  The following muscles were treated today:     Right: UT, LS, SOC- with estim   Left: UT, LS, SOC- with estim      Patients response to todays treatment:   [x]  LTRs  [x]  Muscle Relaxation                 [x]  Pain Relief              [x]  Increased strength/ stability   [x]  Decreased HAs         []  Post needling soreness      [x]  Increased ROM            []  Other:                    Other Objective/Functional Measures:    GROC= +4    Cx ROM: R/L rot= 80/ 70     Post Treatment Pain Level (on 0 to 10) scale:   1  / 10     ASSESSMENT  Assessment/Changes in Function:        [x]  See Progress Note/Recertification           PLAN  [x]  Upgrade activities as tolerated YES Continue plan of care   []  Discharge due to :    []  Other:      Therapist: Brook Panchal PT    Date: 12/19/2018 Time: 1:07 PM     Future Appointments   Date Time Provider Zane Rodrigues   12/27/2018  9:00 AM Esperanza Day, PT REHAB [facility] AT [facility]   12/31/2018 11:30 AM Christy Max, PT REHAB [facility]ENTER AT [facility]

## 2018-12-19 NOTE — PROGRESS NOTES
Srinivasa Floriankitherese Marques [address]  [facility] PHYSICAL THERAPY AT [address] [facility], [address] [place] [address], [address], [address] [place] [address]  Phone: [phone]  Fax: [phone]  PROGRESS NOTE  Patient Name: Corby Talley : 1970   Treatment/Medical Diagnosis: Migraine, unspecified, not intractable, without status migrainosus [G43.909]   Referral Source: Juan Alberto Tyler MD     Date of Initial Visit: 10/22/18 Attended Visits: 11 Missed Visits: -     SUMMARY OF TREATMENT  PT has consisted of manual therapy to include dry needling with ESTIM, therapeutic exercise and patient education of HEP, posture, body mechanics   CURRENT STATUS  Patient reports 50% overall improvement. She reports that HAs are now intermittent and of decreased intensity/ duration. Previous Goals:  1.  I with basic HEP and strategies to relieve pain. 2. The pt will report decreased frequency to <2 x per month for migraine HA. 2. The pt will report +2 Global rating of change to indicate reduced pain and increased ADL tolerance       Prior Level/Current Level:  1) Prior Level: na   Current Level: indep and compliant   Goal Met? yes  2) Prior Level: freq migraines which last 3-4 days   Current Level: 1 migraine apprx every 10 days, lasting < 1 day   Goal Met? progressing  3) Prior Level: na   Current Level: +4   Goal Met? yes    New Goals to be achieved in __4__  weeks:  1. I with final HEP and strategies to relieve pain. 2. The pt will have WFL and symmetrical cervical ROM to reduce tension through c/s and reduce HA. 3. The pt will report +5 or greater Global Rating of change to indicate increased ADL tolerance. 4. Pt to report >75% reduction in pain and frequency of migraines. RECOMMENDATIONS  Continue PT per current POC     If you have any questions/comments please contact us directly at [phone]. Thank you for allowing us to assist in the care of your patient.     Therapist Signature: Linda Emerson, PT Date: 2018     Time: 3:07 PM   NOTE TO PHYSICIAN:  PLEASE COMPLETE THE ORDERS BELOW AND FAX TO   [facility]: [phone]. If you are unable to process this request in 24 hours please contact our office: [phone].    ___ I have read the above report and request that my patient continue as recommended.   ___ I have read the above report and request that my patient continue therapy with the following changes/special instructions:_________________________________________________________   ___ I have read the above report and request that my patient be discharged from therapy.      Physician Signature:        Date:       Time:

## 2018-12-20 ENCOUNTER — APPOINTMENT (OUTPATIENT)
Dept: PHYSICAL THERAPY | Age: 48
End: 2018-12-20
Payer: COMMERCIAL

## 2018-12-27 ENCOUNTER — HOSPITAL ENCOUNTER (OUTPATIENT)
Dept: PHYSICAL THERAPY | Age: 48
Discharge: HOME | End: 2018-12-27
Payer: COMMERCIAL

## 2018-12-27 PROCEDURE — 97140 MANUAL THERAPY 1/> REGIONS: CPT

## 2018-12-27 PROCEDURE — 97110 THERAPEUTIC EXERCISES: CPT

## 2018-12-27 NOTE — PROGRESS NOTES
PHYSICAL THERAPY - DAILY TREATMENT NOTE - DRY NEEDLE NOTE    Patient Name: Jorge Pierce        Date: 2018  : 1970   YES Patient  Verified  Visit #:   12(7)   of   16  Insurance: Payor: Abrahan Ocasio / Plan: [address] PT / Product Type: Commerical /      In time: 900 Out time: 955   Total Treatment Time: 50     Medicare Time Tracking (below)   Total Timed Codes (min):   1:1 Treatment Time:       TREATMENT AREA =  Migraine, unspecified, not intractable, without status migrainosus [G43.909]    SUBJECTIVE  Pain Level (on 0 to 10 scale):  1-2   10   Medication Changes/New allergies or changes in medical history, any new surgeries or procedures?     NO    If yes, update Summary List   Subjective Functional Status/Changes:  []  No changes reported     L HA--low level          OBJECTIVE  Modalities Rationale:  Decrease post- needling soreness to improve patient's ability to perform ADLs     min [] Estim, type/location:                                      []  att     []  unatt     []  w/US     []  w/ice    []  w/heat    min []  Mechanical Traction: type/lbs                   []  pro   []  sup   []  int   []  cont    []  before manual    []  after manual    min []  Ultrasound, settings/location:      min []  Iontophoresis w/ dexamethasone, location:                                               []  take home patch       []  in clinic   10 min []  Ice     [x]  Heat    location/position:     min []  Vasopneumatic Device, press/temp:     min []  Other:    [x] Skin assessment post-treatment (if applicable):    [x]  intact    [x]  redness- no adverse reaction     []redness  adverse reaction:        15 min Therapeutic Exercise:  [x]  See flow sheet   Rationale:      increase ROM, increase strength and inc stability to improve the patients ability to perform ADLs     25 min Manual Therapy: DN with ESTIM-( needle insertion time not included)   Rationale:      decrease pain, increase ROM, increase tissue extensibility and decrease trigger points to improve patient's ability to perform ADLs      throughout treatment time min Patient Education:  Yes  Reviewed HEP   []  Progressed/Changed HEP based on:        OTHER OBJECTIVE/FUNCTIONAL MEASURES:        Dry Needling Procedure Note    Dry Needle Session Number:  4    Procedure: An intramuscular manual therapy (dry needling) and a neuro-muscular re-education treatment was done to deactivate myofascial trigger points, with a 15/30 gauge solid filament needle, under aseptic technique. Indication(s): [x] Muscle spasms [x] Myalgia/Myositis  [] Muscle cramps      [x] Muscle imbalances [] TMD (TMJ) [x] Myofascial pain & dysfunction     [] Other: __    Chart reviewed for the following:  Maria Del Rosario ALICEA PT, have reviewed the medical history, summary list and precautions/contraindications for [employer].     TIME OUT performed immediately prior to start of procedure:  905 (enter time the timeout was completed)  Maria Del Rosario ALICEA PT, have performed the following reviews on [employer] prior to the start of the session:      [x] Patient was identified by name and date of birth    [x] Agreement on all muscles being treated was verified   [x] Purpose of dry needling, side effects, possible complications, risks and benefits were explained to the patient   [x] Procedure site(s) verified  [x] Patient was positioned for comfort and draped for privacy  [x] Informed Consent was signed (initial visit) and verified verbally (subsequent visits)  [x] Patient was instructed on the need to report the use of blood thinners and/or immunosuppressant medications  [x] How to respond to possible adverse effects of treatment  [x] Self treatment of post needling soreness: ice, heat (moist heat, heat wraps) and stretching  [x] Opportunity was given to ask any questions, all questions were answered            Treatment:  The following muscles were treated today:    Right: UT, LS, SCPM, subocc Left: UT, LS, SCPM, subocc     Patients response to todays treatment:   [x]  LTRs [x]  Muscle Relaxation  [x]  Pain Relief  []  Increased strength/ stability   [x]  Decreased HAs []  Post needling soreness [x]  Increased ROM   []  Other:         Post Treatment Pain Level (on 0 to 10) scale:   1  / 10     ASSESSMENT  Assessment/Changes in Function:     Dec density of TP, dec HA intensity/ freq     []  See Progress Note/Recertification   Patient will continue to benefit from skilled PT services to modify and progress therapeutic interventions, address functional mobility deficits, address ROM deficits, address strength deficits and analyze and address soft tissue restrictions to attain remaining goals.    Progress toward goals / Updated goals:    Steady progress     PLAN  [x]  Upgrade activities as tolerated yes Continue plan of care   []  Discharge due to :    []  Other:      Therapist: Mayra Mena PT    Date: 12/27/2018 Time: 9:03 AM

## 2018-12-31 ENCOUNTER — APPOINTMENT (OUTPATIENT)
Dept: PHYSICAL THERAPY | Age: 48
End: 2018-12-31
Payer: COMMERCIAL

## 2019-01-07 ENCOUNTER — HOSPITAL ENCOUNTER (OUTPATIENT)
Dept: PHYSICAL THERAPY | Age: 49
Discharge: HOME | End: 2019-01-07
Payer: COMMERCIAL

## 2019-01-07 PROCEDURE — 97110 THERAPEUTIC EXERCISES: CPT | Performed by: PHYSICAL THERAPIST

## 2019-01-07 PROCEDURE — 97140 MANUAL THERAPY 1/> REGIONS: CPT | Performed by: PHYSICAL THERAPIST

## 2019-01-07 NOTE — PROGRESS NOTES
PHYSICAL THERAPY - DAILY TREATMENT NOTE Patient Name: Milka Martinez        Date: 2019 : 1970   YES Patient  Verified Visit #:   90 (8)   of   16  Insurance: Payor: Kiley Villegas / [place]: [place] [employer]  [employer]IT[employer] PT / Product Type: Commerical / In time: 1205 Out time: 4961 Total Treatment Time: 42 Medicare/[facility] Time Tracking (below) Total Timed Codes (min):   1:1 Treatment Time:    
TREATMENT AREA =  Migraine, unspecified, not intractable, without status migrainosus [G43.909] SUBJECTIVE Pain Level (on 0 to 10 scale):  3-4  / 10 Medication Changes/New allergies or changes in medical history, any new surgeries or procedures? NO    If yes, update Summary List  
Subjective Functional Status/Changes:  []  No changes reported Functional improvements: less pain, no migraine. Functional impairments: L shoulder/neck pain beginning yesterday and worse today. OBJECTIVE Modalities Rationale:     decrease pain and increase tissue extensibility to improve patient's ability to relieve HA.    
 min [] Estim, type/location:   
                                 []  att     []  unatt     []  w/US     []  w/ice    []  w/heat 
 min []  Mechanical Traction: type/lbs   
               []  pro   []  sup   []  int   []  cont    []  before manual    []  after manual  
 min []  Ultrasound, settings/location:    
 min []  Iontophoresis w/ dexamethasone, location:   
                                           []  take home patch       []  in clinic  
10 min []  Ice     [x]  Heat    location/position: Supine to c/s  
 min []  Vasopneumatic Device, press/temp:   
 min []  Other:   
[x] Skin assessment post-treatment (if applicable):   
[x]  intact    []  redness- no adverse reaction    
[]redness  adverse reaction:   
 
17 min Therapeutic Exercise:  [x]  See flow sheet Rationale:      increase ROM and increase strength to improve the patients ability to have less pain and migraines. 15 min Manual Therapy: Technique:     
[x] S/DTM []IASTM []PROM [x] Passive Stretching  
[]manual TPR []Jt manipulation:Gr I [] II []  III [] IV[] V[] Treatment Area:  C/s and UT, OA release Rationale:      decrease pain, increase ROM and increase tissue extensibility to improve patient's ability to perform ADLs without HA and neck pain. Billed With/As: 
 [] TE 
 [] TA 
 [] Neuro 
 [] Self Care Patient Education: [x] Review HEP [] Progressed/Changed HEP based on:  
[] positioning   [] body mechanics   [] transfers   [] heat/ice application   
[] other:   
Other Objective/Functional Measures: 
 
L>R cervical paraspinal, sub-occipital , and UT ms tightness Post Treatment Pain Level (on 0 to 10) scale:   1 to 2  / 10 ASSESSMENT Assessment/Changes in Function: The pt has improvement with no migraines. Ongoing musculoskeletal spasms and pain secondary to postural abnormalities. []  See Progress Note/Recertification Patient will continue to benefit from skilled PT services to modify and progress therapeutic interventions, address functional mobility deficits, address ROM deficits, address strength deficits, analyze and address soft tissue restrictions, analyze and cue movement patterns, analyze and modify body mechanics/ergonomics and assess and modify postural abnormalities to attain remaining goals. Progress toward goals / Updated goals: 
Meeting LTG #4 with reduced migraine frequency by 75%. PLAN 
 
[]  Upgrade activities as tolerated YES Continue plan of care  
[]  Discharge due to :   
[]  Other:   
 
Therapist: Cierra Ham PT Date: 1/7/2019 Time: 12:55 PM  
 
Future Appointments Date Time Provider Zane Rodrigues 1/9/2019 10:00 AM Jelena Dyer PT REHAB CENTER AT [facility]  
1/14/2019 12:00 PM Herve Max PT REHAB CENTER AT [facility]  
1/16/2019  9:30 AM Jelena Dyer PT REHAB CENTER AT [facility]  
1/21/2019 11:30 AM Herve Max, PT REHAB CENTER AT [facility]  
 1/23/2019  2:00 PM Dodie Maciel, PT REHAB CENTER AT [facility]  
1/28/2019 12:00 PM Kena Max, PT REHAB CENTER AT [facility]  
1/30/2019 10:00 AM Dodie Maciel, PT REHAB CENTER AT [facility]

## 2019-01-09 ENCOUNTER — HOSPITAL ENCOUNTER (OUTPATIENT)
Dept: PHYSICAL THERAPY | Age: 49
Discharge: HOME | End: 2019-01-09
Payer: COMMERCIAL

## 2019-01-09 PROCEDURE — 97140 MANUAL THERAPY 1/> REGIONS: CPT

## 2019-01-09 PROCEDURE — 97110 THERAPEUTIC EXERCISES: CPT

## 2019-01-09 NOTE — PROGRESS NOTES
PHYSICAL THERAPY - DAILY TREATMENT NOTE - DRY NEEDLE NOTE Patient Name: Brendon Morrow        Date: 2019 : 1970   YES Patient  Verified Visit #:   14 (9)   of   16  Insurance: Payor: Kaushal Mckee / [place]: [place] [employer]  CAPITATED PT / Product Type: Commerical / In time: 1010 Out time: 1100 Total Treatment Time: 45 Medicare Time Tracking (below) Total Timed Codes (min):   1:1 Treatment Time:    
TREATMENT AREA =  Migraine, unspecified, not intractable, without status migrainosus [G43.909] SUBJECTIVE Pain Level (on 0 to 10 scale):    / 10 Medication Changes/New allergies or changes in medical history, any new surgeries or procedures? NO    If yes, update Summary List  
Subjective Functional Status/Changes:  []  No changes reported L scap pain since the weekend HAs almost not happening OBJECTIVE Modalities Rationale:  Decrease post- needling soreness to improve patient's ability to perform ADLs 
 
 min [] Estim, type/location:   
                                 []  att     []  unatt     []  w/US     []  w/ice    []  w/heat 
 min []  Mechanical Traction: type/lbs   
               []  pro   []  sup   []  int   []  cont    []  before manual    []  after manual  
 min []  Ultrasound, settings/location:    
 min []  Iontophoresis w/ dexamethasone, location:   
                                           []  take home patch       []  in clinic  
10 min []  Ice     [x]  Heat    location/position:   
 min []  Vasopneumatic Device, press/temp:   
 min []  Other:   
[] Skin assessment post-treatment (if applicable):   
[]  intact    []  redness- no adverse reaction    
[]redness  adverse reaction:     
10 min Therapeutic Exercise:  [x]  See flow sheet Rationale:      increase ROM, increase strength and inc stability to improve the patients ability to perform ADLs with less pain 25 min Manual Therapy: Palpation, assessment of TP and DN with ESTIM-(needle insertion time not included) Rationale:      decrease pain, increase ROM, increase tissue extensibility and decrease trigger points to improve patient's ability to perform ADLs with less pain Billed With/As: 
 [] TE 
 [x] MT 
 [] Neuro 
 [] Self Care Patient Education: [x] Review HEP [] Progressed/Changed HEP based on:  
[] positioning   [] body mechanics   [x] indication/ precautions/ expectations of DN 
  [] heat/ice application   
[] other: OTHER OBJECTIVE/FUNCTIONAL MEASURES: 
 
 
 
Dry Needling Procedure Note Dry Needle Session Number:  0 Procedure: An intramuscular manual therapy (dry needling) and a neuro-muscular re-education treatment was done to deactivate myofascial trigger points, with a 15/30 gauge solid filament needle, under aseptic technique. Indication(s): [x] Muscle spasms [x] Myalgia/Myositis  [] Muscle cramps [x] Muscle imbalances [] TMD (TMJ) [x] Myofascial pain & dysfunction 
   [] Other: __ Chart reviewed for the following: 
IMere PT, have reviewed the medical history, summary list and precautions/contraindications for [employer]. TIME OUT performed immediately prior to start of procedure: 
1015 (enter time the timeout was completed) Mere ALICEA PT, have performed the following reviews on [employer] prior to the start of the session:     
[x] Patient was identified by name and date of birth   
[x] Agreement on all muscles being treated was verified  
[x] Purpose of dry needling, side effects, possible complications, risks and benefits were explained to the patient [x] Procedure site(s) verified 
[x] Patient was positioned for comfort and draped for privacy [x] Informed Consent was signed (initial visit) and verified verbally (subsequent visits) [x] Patient was instructed on the need to report the use of blood thinners and/or immunosuppressant medications [x] How to respond to possible adverse effects of treatment [x] Self treatment of post needling soreness: ice, heat (moist heat, heat wraps) and stretching 
[x] Opportunity was given to ask any questions, all questions were answered Treatment: The following muscles were treated today: 
 
Right: UT, T5 MF Left: Mid trap x 3, UT, T5 MF Patients response to todays treatment:  
[x]  LTRs [x]  Muscle Relaxation  [x]  Pain Relief  []  Increased strength/ stability []  Decreased HAs []  Post needling soreness [x]  Increased ROM []  Other:   
 
  
Post Treatment Pain Level (on 0 to 10) scale:   0  / 10 ASSESSMENT Assessment/Changes in Function:  
 
Minimal HAs reported Pt reports return to gym 
  
[]  See Progress Note/Recertification Patient will continue to benefit from skilled PT services to modify and progress therapeutic interventions, address functional mobility deficits, address ROM deficits, address strength deficits, analyze and address soft tissue restrictions and assess and modify postural abnormalities to attain remaining goals. Progress toward goals / Updated goals: 
Dec pain intensity, minimal HAs PLAN [x]  Upgrade activities as tolerated yes Continue plan of care  
[]  Discharge due to :   
[]  Other:   
 
Therapist: Mandy Hernandes, PT Date: 1/9/2019 Time: 10:11 AM

## 2019-01-14 ENCOUNTER — APPOINTMENT (OUTPATIENT)
Dept: PHYSICAL THERAPY | Age: 49
End: 2019-01-14
Payer: COMMERCIAL

## 2019-01-16 ENCOUNTER — HOSPITAL ENCOUNTER (OUTPATIENT)
Dept: PHYSICAL THERAPY | Age: 49
Discharge: HOME | End: 2019-01-16
Payer: COMMERCIAL

## 2019-01-16 PROCEDURE — 97110 THERAPEUTIC EXERCISES: CPT

## 2019-01-16 PROCEDURE — 97140 MANUAL THERAPY 1/> REGIONS: CPT

## 2019-01-16 NOTE — PROGRESS NOTES
PHYSICAL THERAPY - DAILY TREATMENT NOTE - DRY NEEDLE NOTE Patient Name: Irais He        Date: 2019 : 1970   YES Patient  Verified Visit #:   15 (10)   of   16  Insurance: Payor: Tracy Barrett / Plan: [place] [employer]  CAPITATED PT / Product Type: Commerical / In time: 925 Out time: 1020 Total Treatment Time: 50 Medicare Time Tracking (below) Total Timed Codes (min):   1:1 Treatment Time:    
TREATMENT AREA =  Migraine, unspecified, not intractable, without status migrainosus [G43.909] SUBJECTIVE Pain Level (on 0 to 10 scale):  0  / 10 Medication Changes/New allergies or changes in medical history, any new surgeries or procedures? NO    If yes, update Summary List  
Subjective Functional Status/Changes:  []  No changes reported Neck has been good. HA over the weekend, but may be due to weather change. OBJECTIVE Modalities Rationale:  Decrease post- needling soreness to improve patient's ability to perform ADLs 
 
 min [] Estim, type/location:   
                                 []  att     []  unatt     []  w/US     []  w/ice    []  w/heat 
 min []  Mechanical Traction: type/lbs   
               []  pro   []  sup   []  int   []  cont    []  before manual    []  after manual  
 min []  Ultrasound, settings/location:    
 min []  Iontophoresis w/ dexamethasone, location:   
                                           []  take home patch       []  in clinic  
10 min []  Ice     [x]  Heat    location/position:   
 min []  Vasopneumatic Device, press/temp:   
 min []  Other:   
[x] Skin assessment post-treatment (if applicable):   
[x]  intact    [x]  redness- no adverse reaction    
[]redness  adverse reaction:     
15 min Therapeutic Exercise:  [x]  See flow sheet Rationale:      increase ROM, increase strength and inc stability to improve the patients ability to perform ADLs 25 min Manual Therapy: Palpation, assessment of TP and DN with ESTIM-(needle insertion time not included) Rationale:      decrease pain, increase ROM, increase tissue extensibility and decrease trigger points to improve patient's ability to perform ADLs Billed With/As: 
 [] TE 
 [x] MT 
 [] Neuro 
 [] Self Care Patient Education: [x] Review HEP [] Progressed/Changed HEP based on:  
[] positioning   [] body mechanics   [x] indication/ precautions/ expectations of DN 
  [] heat/ice application   
[] other: OTHER OBJECTIVE/FUNCTIONAL MEASURES: 
 
 
 
Dry Needling Procedure Note Dry Needle Session Number:  3 Procedure: An intramuscular manual therapy (dry needling) and a neuro-muscular re-education treatment was done to deactivate myofascial trigger points, with a 15/30 gauge solid filament needle, under aseptic technique. Indication(s): [x] Muscle spasms [x] Myalgia/Myositis  [] Muscle cramps [x] Muscle imbalances [] TMD (TMJ) [x] Myofascial pain & dysfunction 
   [] Other: __ Chart reviewed for the following: 
ILauryn PT, have reviewed the medical history, summary list and precautions/contraindications for [employer]. TIME OUT performed immediately prior to start of procedure: 
930 (enter time the timeout was completed) Lauryn ALICEA PT, have performed the following reviews on [employer] prior to the start of the session:     
[x] Patient was identified by name and date of birth   
[x] Agreement on all muscles being treated was verified  
[x] Purpose of dry needling, side effects, possible complications, risks and benefits were explained to the patient [x] Procedure site(s) verified 
[x] Patient was positioned for comfort and draped for privacy [x] Informed Consent was signed (initial visit) and verified verbally (subsequent visits) [x] Patient was instructed on the need to report the use of blood thinners and/or immunosuppressant medications [x] How to respond to possible adverse effects of treatment [x] Self treatment of post needling soreness: ice, heat (moist heat, heat wraps) and stretching 
[x] Opportunity was given to ask any questions, all questions were answered Treatment: The following muscles were treated today: 
 
Right: UT/ LS/ SCPM  
Left: UT/ LS/ SCPM/ mid trap Patients response to todays treatment:  
[x]  LTRs [x]  Muscle Relaxation  []  Pain Relief  []  Increased strength/ stability []  Decreased HAs []  Post needling soreness []  Increased ROM []  Other:   
 
  
Post Treatment Pain Level (on 0 to 10) scale:   0  / 10 ASSESSMENT Assessment/Changes in Function:  
 
Min pain/ less severe/ less freq HA 
  
[]  See Progress Note/Recertification Patient will continue to benefit from skilled PT services to modify and progress therapeutic interventions, address functional mobility deficits, address ROM deficits, address strength deficits, analyze and address soft tissue restrictions and analyze and cue movement patterns to attain remaining goals. Progress toward goals / Updated goals: 
Less pain--maintaining gains with PT  
 
PLAN 
[]  Upgrade activities as tolerated yes Continue plan of care  
[]  Discharge due to :   
[x]  Other: Will dec freq to 1x/week Therapist: Flavio Cohn, PT Date: 1/16/2019 Time: 9:26 AM

## 2019-01-21 ENCOUNTER — HOSPITAL ENCOUNTER (OUTPATIENT)
Dept: PHYSICAL THERAPY | Age: 49
Discharge: HOME | End: 2019-01-21
Payer: COMMERCIAL

## 2019-01-21 PROCEDURE — 97140 MANUAL THERAPY 1/> REGIONS: CPT | Performed by: PHYSICAL THERAPIST

## 2019-01-21 PROCEDURE — 97110 THERAPEUTIC EXERCISES: CPT | Performed by: PHYSICAL THERAPIST

## 2019-01-21 NOTE — PROGRESS NOTES
PHYSICAL THERAPY - DAILY TREATMENT NOTE Patient Name: Fanny Needs        Date: 2019 : 1970   YES Patient  Verified Visit #:   16 (11)   of   16  Insurance: Payor: Nuha New / Plan: [place] [employer]  CAPITATED PT / Product Type: Commerical / In time: 1135 Out time: 1230 Total Treatment Time: 54 Medicare/[facility] Time Tracking (below) Total Timed Codes (min):   1:1 Treatment Time:    
TREATMENT AREA =  Migraine, unspecified, not intractable, without status migrainosus [G43.909] SUBJECTIVE Pain Level (on 0 to 10 scale):  0  / 10 Medication Changes/New allergies or changes in medical history, any new surgeries or procedures? NO    If yes, update Summary List  
Subjective Functional Status/Changes:  []  No changes reported Functional improvements: no pain currently. Tolerated most recent weather change and it didn't bother her. Functional impairments: pain to L side last night. Migraine last weekend. OBJECTIVE 35 min Therapeutic Exercise:  [x]  See flow sheet Rationale:      increase ROM and increase strength to improve the patients ability to not have migraine pain interfering with work. 20 min Manual Therapy: Technique:     
[x] S/DTM []IASTM []PROM [x] Passive Stretching  
[x]manual TPR []Jt manipulation:Gr I [] II []  III [] IV[] V[] Treatment Area:  B c/s paraspinals, LS, UT, rhomboid, sub-occipital, SOR, stretch UT, LS Rationale:      decrease pain, increase ROM and increase tissue extensibility to improve patient's ability to  improve patient's ability to perform ADL's with decreased pain 
p Billed With/As: 
 [x] TE 
 [] TA 
 [] Neuro 
 [] Self Care Patient Education: [x] Review HEP [x] Progressed/Changed HEP based on:  
[] positioning   [] body mechanics   [] transfers   [] heat/ice application   
[x] other: Issued final HEP Other Objective/Functional Measures: Added anterior scalene stretch Issued updated HEP as well as gym exercises with weight machines. Post Treatment Pain Level (on 0 to 10) scale:   0  / 10 ASSESSMENT Assessment/Changes in Function: The pt tolerated most recent weather change without issue as compared to prior. R side of c/s and UT are stiff and tight and persisting. []  See Progress Note/Recertification Patient will continue to benefit from skilled PT services to modify and progress therapeutic interventions, address functional mobility deficits, address ROM deficits, address strength deficits, analyze and address soft tissue restrictions, analyze and cue movement patterns, analyze and modify body mechanics/ergonomics and assess and modify postural abnormalities to attain remaining goals. Progress toward goals / Updated goals: 
Met LTG of 75% or fewer migraine HA. Complies with HEP, reports significant improvement. PLAN [x]  Upgrade activities as tolerated YES Continue plan of care  
[]  Discharge due to :   
[]  Other:   
 
Therapist: Charlotte Inman, PT Date: 1/21/2019 Time: 11:35 AM  
 
Future Appointments Date Time Provider Zane Rodrigues 1/30/2019 10:00 AM Cadence Lagunas, PT [facility]

## 2019-01-23 ENCOUNTER — APPOINTMENT (OUTPATIENT)
Dept: PHYSICAL THERAPY | Age: 49
End: 2019-01-23
Payer: COMMERCIAL

## 2019-01-28 ENCOUNTER — APPOINTMENT (OUTPATIENT)
Dept: PHYSICAL THERAPY | Age: 49
End: 2019-01-28
Payer: COMMERCIAL

## 2019-01-30 ENCOUNTER — APPOINTMENT (OUTPATIENT)
Dept: PHYSICAL THERAPY | Age: 49
End: 2019-01-30
Payer: COMMERCIAL

## 2019-02-26 NOTE — PROGRESS NOTES
Srinivasa Marques [address] [facility] PHYSICAL THERAPY AT [address] [facility], [address] [facility], [address] [place] [address], [address] [place] [address]  Phone: [phone]  Fax: [phone] DISCHARGE NOTE Patient Name: Aleah Li : 1970 Treatment/Medical Diagnosis: Migraine, unspecified, not intractable, without status migrainosus [G43.909] Referral Source: Kirti Montano MD    
Date of Initial Visit: 10/22/19 Attended Visits: 16 Missed Visits: 2 SUMMARY OF TREATMENT The pt was seen for therapeutic exercises, HEP and patient education, manual therapy to include dry needling with ESTIM, craniosacral therapy, DTM, trigger point release, HP 
CURRENT STATUS The pt canceled her last appointment and was last seen 19. She was improving and reported improved tolerance of usual triggers of weather change which didn't affect her. She had 75% reduction in migraine frequency and intensity. She had some ongoing spasticity to the R cervical musculature. Previous Goals: 
1. I with final HEP and strategies to relieve pain. 2. The pt will have WFL and symmetrical cervical ROM to reduce tension through c/s and reduce HA. 3. The pt will report +5 or greater Global Rating of change to indicate increased ADL tolerance. 4. Pt to report >75% reduction in pain and frequency of migraines. Prior Level/Current Level: 
1) Prior Level: compliant with basic HEP and strategies Current Level: compliant, I Goal Met? yes 2) Prior Level: rotation R = 78 and L = 70 degrees Current Level: unable to assess but appears to be close to symmetrical 
 Goal Met? yes 3) Prior Level: +4 
 Current Level: unable to assess Goal Met? n/a 
4) Prior Level: 1 migraine every 10 days Current Level: 75% improvement Goal Met? yes RECOMMENDATIONS Recommend DC due to not returning to PT and seemed to be improved and I with patient care. If you have any questions/comments please contact us directly at [phone]. Thank you for allowing us to assist in the care of your patient. Therapist Signature: Cj Johnson PT Date: 2/26/2019 Reporting Period: 
Certification Period: 
  Time: 10:56 AM  
NOTE TO PHYSICIAN:  PLEASE COMPLETE THE ORDERS BELOW AND FAX TO [facility] [place]: [phone].  
 
 
Physician Signature:       Date:      Time:

## 2020-03-17 PROBLEM — G43.109 MIGRAINE WITH AURA: Status: ACTIVE | Noted: 2018-10-15
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
## 2020-03-17 PROBLEM — Z98.1 STATUS POST LUMBAR SPINAL FUSION: Status: ACTIVE | Noted: 2018-06-18

## 2020-03-17 PROBLEM — R00.0 TACHYCARDIA: Status: ACTIVE | Noted: 2020-03-17

## 2020-03-17 PROBLEM — R51.9 HEADACHE: Status: ACTIVE | Noted: 2018-10-15

## 2020-03-17 PROBLEM — M25.50 PAIN IN JOINT: Status: ACTIVE | Noted: 2020-03-17

## 2020-03-17 PROBLEM — I95.9 HYPOTENSION: Status: ACTIVE | Noted: 2020-03-17

## 2020-03-17 PROBLEM — F34.1 DYSTHYMIA: Status: ACTIVE | Noted: 2020-03-17

## 2020-03-17 PROBLEM — M51.26 HNP (HERNIATED NUCLEUS PULPOSUS), LUMBAR: Status: ACTIVE | Noted: 2018-06-18

## 2020-03-17 PROBLEM — R55 SYNCOPE AND COLLAPSE: Status: ACTIVE | Noted: 2020-03-17
